# Patient Record
Sex: MALE | Race: WHITE | NOT HISPANIC OR LATINO | URBAN - NONMETROPOLITAN AREA
[De-identification: names, ages, dates, MRNs, and addresses within clinical notes are randomized per-mention and may not be internally consistent; named-entity substitution may affect disease eponyms.]

---

## 2017-06-12 NOTE — PATIENT DISCUSSION
(P78.312) Vitreous degeneration, bilateral - Assesment : Examination revealed PVD - Plan : Monitor for changes. Advised patient to call our office with decreased vision or an increase in flashes and/or floaters.

## 2017-06-12 NOTE — PATIENT DISCUSSION
(E15.004) Dermatochalasis of right upper eyelid - Assesment : Examination revealed Dermatochalasis - Plan : Monitor for changes. Advised patient to call our office with decreased vision or increased symptoms.

## 2017-06-12 NOTE — PATIENT DISCUSSION
(W08.067) Keratoconjunct sicca, not specified as Sjogren's, bilateral - Assesment : Examination revealed Dry Eye Syndrome - Plan : Monitor for changes. Advised patient to call our office with decreased vision or increased symptoms.  AT's PRN RV 1 year EXAM/MAC OCT

## 2017-06-12 NOTE — PATIENT DISCUSSION
(Q03.084) Dermatochalasis of left upper eyelid - Assesment : Examination revealed Dermatochalasis - Plan : Monitor for changes. Advised patient to call our office with decreased vision or increased symptoms.

## 2019-06-19 ENCOUNTER — IMPORTED ENCOUNTER (OUTPATIENT)
Dept: URBAN - NONMETROPOLITAN AREA CLINIC 1 | Facility: CLINIC | Age: 69
End: 2019-06-19

## 2019-06-19 PROBLEM — H25.813: Noted: 2019-06-19

## 2019-06-19 PROCEDURE — 92004 COMPRE OPH EXAM NEW PT 1/>: CPT

## 2019-06-19 NOTE — PATIENT DISCUSSION
Cataract(s)-Visually significant cataract OU .-Cataract(s) causing symptomatic impairment of visual function not correctable with a tolerable change in glasses or contact lenses lighting or non-operative means resulting in specific activity limitations and/or participation restrictions including but not limited to reading viewing television driving or meeting vocational or recreational needs. -Expectation is clearer vision and functional improvement in symptoms as well as reduced glare disability after cataract removal.-Order IOLMaster and OPD today. -Recommend Stnadard/Traditional based on today's OPD testing and lifestyle questionnaire.-All questions were answered regarding surgery including pre and post-op medications appointments activity restrictions and anesthetic usage.-The risks benefits and alternatives and special risk factors for the patient were discussed in detail including but not limited to: bleeding infection retinal detachment vitreous loss problems with the implant and possible need for additional surgery.-Although rare the possibility of complete vision loss was discussed.-The possible need for glasses post-operatively was discussed.-Order medical clearance exam based on history of NIDDM-Patient elects to proceed with cataract surgery OD . Will schedule at patient's convenience and re-evaluate OS  in the future. Discussed in detial all lens options with patient he wishes to proceed with standard which is what his insurance will cover

## 2019-07-19 ENCOUNTER — IMPORTED ENCOUNTER (OUTPATIENT)
Dept: URBAN - NONMETROPOLITAN AREA CLINIC 1 | Facility: CLINIC | Age: 69
End: 2019-07-19

## 2019-07-19 PROBLEM — I10: Noted: 2019-07-19

## 2019-07-19 PROBLEM — J44.9: Noted: 2019-07-19

## 2019-07-19 PROBLEM — E78.5: Noted: 2019-07-19

## 2019-07-19 PROBLEM — E11.9: Noted: 2019-07-19

## 2019-07-19 PROBLEM — E03.9: Noted: 2019-07-19

## 2019-07-19 PROBLEM — Z01.818: Noted: 2019-07-19

## 2019-07-19 PROBLEM — I63.9: Noted: 2019-07-19

## 2019-08-07 ENCOUNTER — IMPORTED ENCOUNTER (OUTPATIENT)
Dept: URBAN - NONMETROPOLITAN AREA CLINIC 1 | Facility: CLINIC | Age: 69
End: 2019-08-07

## 2019-08-07 PROBLEM — Z98.41: Noted: 2019-08-07

## 2019-08-07 PROBLEM — H26.491: Noted: 2019-08-07

## 2019-08-07 PROBLEM — H25.12: Noted: 2019-08-07

## 2019-08-07 PROBLEM — E11.9: Noted: 2019-08-07

## 2019-08-07 NOTE — PATIENT DISCUSSION
1 Day POV CE OD 8/6/19 Standard- Discussed diagnosis in detail with patient- Patient is stable and doing well- Wound intact- PCO OD noted but stable and no treatment needed at this timr - Continue all post op drops as directed- Continue to monitor- RTC 1 week POV with Dr. Stacia Khanna

## 2019-08-13 ENCOUNTER — IMPORTED ENCOUNTER (OUTPATIENT)
Dept: URBAN - NONMETROPOLITAN AREA CLINIC 1 | Facility: CLINIC | Age: 69
End: 2019-08-13

## 2019-08-13 PROBLEM — I63.9: Noted: 2019-08-13

## 2019-08-13 PROBLEM — E03.9: Noted: 2019-08-13

## 2019-08-13 PROBLEM — J44.9: Noted: 2019-08-13

## 2019-08-13 PROBLEM — E78.5: Noted: 2019-08-13

## 2019-08-13 PROBLEM — E11.9: Noted: 2019-08-13

## 2019-08-13 PROBLEM — I10: Noted: 2019-08-13

## 2019-08-13 PROBLEM — Z01.818: Noted: 2019-08-13

## 2019-08-13 PROCEDURE — 99024 POSTOP FOLLOW-UP VISIT: CPT

## 2019-08-13 PROCEDURE — 92012 INTRM OPH EXAM EST PATIENT: CPT

## 2019-08-13 NOTE — PATIENT DISCUSSION
Cataract(s)-Visually significant cataract OS . -Cataract(s) causing symptomatic impairment of visual function not correctable with a tolerable change in glasses or contact lenses lighting or non-operative means resulting in specific activity limitations and/or participation restrictions including but not limited to reading viewing television driving or meeting vocational or recreational needs. -Expectation is clearer vision and functional improvement in symptoms as well as reduced glare disability after cataract removal.-Recommend Standard/Traditonal based on previous OPD testing and lifestyle questionnaire.-All questions were answered regarding surgery including pre and post-op medications appointments activity restrictions and anesthetic usage.-The risks benefits and alternatives and special risk factors for the patient were discussed in detail including but not limited to: bleeding infection retinal detachment vitreous loss problems with the implant and possible need for additional surgery.-Although rare the possibility of complete vision loss was discussed.-The need for glasses post-operatively was discussed.-Patient elects to proceed with cataract surgery OS . Will schedule at patient's convenience. s/p PCIOL CE OD 8/6/19 Standard IOL -Pt doing well at 1 week s/p PCIOL. -Continue post-op gtts according to instruction sheet.-Okay to resume usual activites and d/c eye shield. -Mild PCO trace noted on todays exam no treatment needed at this time.  Continue to monitor

## 2019-08-28 ENCOUNTER — IMPORTED ENCOUNTER (OUTPATIENT)
Dept: URBAN - NONMETROPOLITAN AREA CLINIC 1 | Facility: CLINIC | Age: 69
End: 2019-08-28

## 2019-08-28 PROBLEM — Z98.42: Noted: 2019-08-28

## 2019-08-28 PROBLEM — Z98.41: Noted: 2019-08-28

## 2019-08-28 PROCEDURE — 99024 POSTOP FOLLOW-UP VISIT: CPT

## 2019-08-28 NOTE — PATIENT DISCUSSION
1 Day POV CE OS 8/27/19 CE OD 8/6/19 Standard-  The pt has undergone successful cataract extraction with Intraocular lens implantation in both eyes now. -  PO examination is normal and visual acuity has improved. -  The pt has been instructed to call the office immediately if there is increased redness pain or vision loss. -  Instructed patient not to rub the eye and don’t go swimming. -  Pt should return in 1 week for follow up. -  Ocular meds plan discussed and patient received printed take home instructions.

## 2019-09-03 ENCOUNTER — IMPORTED ENCOUNTER (OUTPATIENT)
Dept: URBAN - NONMETROPOLITAN AREA CLINIC 1 | Facility: CLINIC | Age: 69
End: 2019-09-03

## 2019-09-03 PROCEDURE — 99024 POSTOP FOLLOW-UP VISIT: CPT

## 2019-09-03 NOTE — PATIENT DISCUSSION
1 Week POV CE OS 8/27/19 CE OD 8/6/19 - Standard OU- Discussed diagnosis in detail with patient - Patient is stable and doing well - Wound intact- Continue all post op drops as directed - Continue to monitor - RTC 3 weeks POV MR

## 2019-09-23 ENCOUNTER — IMPORTED ENCOUNTER (OUTPATIENT)
Dept: URBAN - NONMETROPOLITAN AREA CLINIC 1 | Facility: CLINIC | Age: 69
End: 2019-09-23

## 2019-09-23 PROBLEM — H26.491: Noted: 2019-12-18

## 2019-09-23 PROBLEM — Z98.41: Noted: 2019-08-28

## 2019-09-23 PROBLEM — H01.024: Noted: 2019-09-23

## 2019-09-23 PROBLEM — Z96.1: Noted: 2019-12-18

## 2019-09-23 PROBLEM — H01.021: Noted: 2019-09-23

## 2019-09-23 PROBLEM — Z98.42: Noted: 2019-08-28

## 2019-09-23 PROCEDURE — 99024 POSTOP FOLLOW-UP VISIT: CPT

## 2019-12-18 ENCOUNTER — IMPORTED ENCOUNTER (OUTPATIENT)
Dept: URBAN - NONMETROPOLITAN AREA CLINIC 1 | Facility: CLINIC | Age: 69
End: 2019-12-18

## 2019-12-18 PROBLEM — Z96.1: Noted: 2019-12-18

## 2019-12-18 PROCEDURE — 99213 OFFICE O/P EST LOW 20 MIN: CPT

## 2019-12-18 NOTE — PATIENT DISCUSSION
Pseudophakia OU with PCO OU- Discussed diagnosis in detail with patient - Patient DEFERS MR today 12/18/19- PCO OD noted but stable and no treatment needed at this time - Continue to monitor - RTC 9 months Complete  Blepharitis OU - Discussed diagnosis in detail with patient- Recommend patient using [employer] baby shampoo to scrub lid daily- Continue to monitor

## 2022-04-15 ASSESSMENT — TONOMETRY
OS_IOP_MMHG: 10
OS_IOP_MMHG: 14
OD_IOP_MMHG: 12
OD_IOP_MMHG: 08
OS_IOP_MMHG: 14
OD_IOP_MMHG: 11
OS_IOP_MMHG: 14
OS_IOP_MMHG: 14
OD_IOP_MMHG: 14
OS_IOP_MMHG: 11
OD_IOP_MMHG: 14
OD_IOP_MMHG: 09
OD_IOP_MMHG: 12
OS_IOP_MMHG: 14

## 2022-04-15 ASSESSMENT — VISUAL ACUITY
OS_SC: 20/60
OS_CC: 20/30-
OS_PH: 20/40
OD_CC: 20/30-
OS_GLARE: 20/100
OD_GLARE: 20/100
OD_PH: 20/30
OS_GLARE: 20/100
OS_CC: J7
OS_GLARE: 20/100
OD_SC: 20/80
OS_PH: 20/40
OD_PH: 20/30-
OD_GLARE: 20/100
OD_CC: 20/25-2
OD_CC: 20/25-
OS_CC: 20/70
OD_CC: 20/30-1
OD_CC: J7
OS_CC: 20/30-2
OD_CC: 20/25-2
OS_GLARE: 20/100
OD_CC: 20/40
OS_CC: 20/30+
OS_CC: CF4'
OD_PAM: 20/30
OS_AM: 20/30

## 2025-02-03 ENCOUNTER — NEW PATIENT (OUTPATIENT)
Age: 75
End: 2025-02-03

## 2025-02-03 DIAGNOSIS — H52.4: ICD-10-CM

## 2025-02-03 PROCEDURE — S0620 ROUTINE OPHTHALMOLOGICAL EXA: HCPCS
